# Patient Record
Sex: MALE | Race: WHITE | Employment: FULL TIME | URBAN - METROPOLITAN AREA
[De-identification: names, ages, dates, MRNs, and addresses within clinical notes are randomized per-mention and may not be internally consistent; named-entity substitution may affect disease eponyms.]

---

## 2023-03-28 ENCOUNTER — HOSPITAL ENCOUNTER (EMERGENCY)
Age: 38
Discharge: HOME | End: 2023-03-28
Attending: EMERGENCY MEDICINE

## 2023-03-28 VITALS
OXYGEN SATURATION: 100 % | HEIGHT: 71 IN | SYSTOLIC BLOOD PRESSURE: 134 MMHG | TEMPERATURE: 97.8 F | BODY MASS INDEX: 25.9 KG/M2 | HEART RATE: 69 BPM | RESPIRATION RATE: 18 BRPM | WEIGHT: 185 LBS | DIASTOLIC BLOOD PRESSURE: 73 MMHG

## 2023-03-28 DIAGNOSIS — S11.81XA: Primary | ICD-10-CM

## 2023-03-28 PROCEDURE — 99284 EMERGENCY DEPT VISIT MOD MDM: CPT

## 2023-03-28 PROCEDURE — 12002 RPR S/N/AX/GEN/TRNK2.6-7.5CM: CPT

## 2023-03-28 PROCEDURE — 6360000002 HC RX W HCPCS: Performed by: EMERGENCY MEDICINE

## 2023-03-28 PROCEDURE — 90471 IMMUNIZATION ADMIN: CPT | Performed by: EMERGENCY MEDICINE

## 2023-03-28 PROCEDURE — 90714 TD VACC NO PRESV 7 YRS+ IM: CPT | Performed by: EMERGENCY MEDICINE

## 2023-03-28 PROCEDURE — 2500000003 HC RX 250 WO HCPCS: Performed by: EMERGENCY MEDICINE

## 2023-03-28 RX ORDER — LIDOCAINE HYDROCHLORIDE AND EPINEPHRINE 10; 10 MG/ML; UG/ML
20 INJECTION, SOLUTION INFILTRATION; PERINEURAL
Status: COMPLETED | OUTPATIENT
Start: 2023-03-28 | End: 2023-03-28

## 2023-03-28 RX ORDER — CEPHALEXIN 500 MG/1
500 CAPSULE ORAL 3 TIMES DAILY
Qty: 21 CAPSULE | Refills: 0 | Status: SHIPPED | OUTPATIENT
Start: 2023-03-28 | End: 2023-04-04

## 2023-03-28 RX ADMIN — CLOSTRIDIUM TETANI TOXOID ANTIGEN (FORMALDEHYDE INACTIVATED) AND CORYNEBACTERIUM DIPHTHERIAE TOXOID ANTIGEN (FORMALDEHYDE INACTIVATED) 0.5 ML: 5; 2 INJECTION, SUSPENSION INTRAMUSCULAR at 09:08

## 2023-03-28 RX ADMIN — LIDOCAINE HYDROCHLORIDE AND EPINEPHRINE 20 ML: 10; 10 INJECTION, SOLUTION INFILTRATION; PERINEURAL at 09:00

## 2023-03-28 ASSESSMENT — PAIN SCALES - GENERAL
PAINLEVEL_OUTOF10: 3
PAINLEVEL_OUTOF10: 0

## 2023-03-28 ASSESSMENT — ENCOUNTER SYMPTOMS
GASTROINTESTINAL NEGATIVE: 1
RESPIRATORY NEGATIVE: 1

## 2023-03-28 ASSESSMENT — LIFESTYLE VARIABLES
HOW OFTEN DO YOU HAVE A DRINK CONTAINING ALCOHOL: 2-3 TIMES A WEEK
HOW MANY STANDARD DRINKS CONTAINING ALCOHOL DO YOU HAVE ON A TYPICAL DAY: 3 OR 4

## 2023-03-28 ASSESSMENT — PAIN - FUNCTIONAL ASSESSMENT: PAIN_FUNCTIONAL_ASSESSMENT: 0-10

## 2023-03-28 ASSESSMENT — PAIN DESCRIPTION - PAIN TYPE: TYPE: ACUTE PAIN

## 2023-03-28 ASSESSMENT — PAIN DESCRIPTION - LOCATION: LOCATION: NECK

## 2023-03-28 NOTE — ED NOTES
I have reviewed discharge instructions with the patient. The patient verbalized understanding. Patient left ED via Discharge Method: ambulatory to Home with self    Opportunity for questions and clarification provided. Patient given 1 scripts. To continue your aftercare when you leave the hospital, you may receive an automated call from our care team to check in on how you are doing. This is a free service and part of our promise to provide the best care and service to meet your aftercare needs.  If you have questions, or wish to unsubscribe from this service please call [phone]. Thank you for Choosing our [facility] Emergency Department.         Virginia Ivan RN  03/28/23 [id number]

## 2023-03-28 NOTE — DISCHARGE INSTRUCTIONS
Keep area clean and dry. Wash with soap and water. Return to the emergency department in 10days to 2 weeks for staple removal.  Take antibiotics as prescribed. Return to the emergency department for signs of infection or any other concerns.

## 2023-03-28 NOTE — ED PROVIDER NOTES
Emergency Department Provider Note                   PCP:                None None               Age: 40 y.o. Sex: male     DISPOSITION Discharge - Pending Orders Complete 03/28/2023 09:07:17 AM       ICD-10-CM    1. Laceration of nape of neck, initial encounter  S11.81XA           MEDICAL DECISION MAKING  Complexity of Problems Addressed:  1 or more acute illnesses that pose a threat to life or bodily function. Data Reviewed and Analyzed:  Category 1:     I ordered each unique test.  I interpreted the results of each unique test.        Category 2:       Category 3: Discussion of management or test interpretation. 36-year-old  male presented emergency department with laceration to the posterior neck. Patient area of laceration did not warrant CT imaging as no vascular compromise or neurovascular compromise was noted. Patient's tetanus shot was updated in the emergency department. His wound was closed as per the procedure note. Patient will be started on antibiotics prophylactically. He will return to the emergency department in 10 days to 2 weeks for staple removal.  He will keep the area clean and dry. He will return for any signs of infection. Risk of Complications and/or Morbidity of Patient Management:  Prescription drug management performed and Patient was discharged risks and benefits of hospitalization were considered     Alpha Godfrey is a 40 y.o. male who presents to the Emergency Department with chief complaint of    Chief Complaint   Patient presents with    Laceration     neck      36-year-old  male with no significant past medical history presented to the emergency department with complaints laceration to his posterior neck occurred 10 to 15 minutes prior to arrival while working.   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  Patient moved a ladder causing the sheet-metal to shift to come loose and landed